# Patient Record
Sex: FEMALE | Race: WHITE | NOT HISPANIC OR LATINO | Employment: PART TIME
[De-identification: names, ages, dates, MRNs, and addresses within clinical notes are randomized per-mention and may not be internally consistent; named-entity substitution may affect disease eponyms.]

---

## 2017-09-10 ENCOUNTER — HEALTH MAINTENANCE LETTER (OUTPATIENT)
Age: 27
End: 2017-09-10

## 2019-04-13 ENCOUNTER — HOSPITAL ENCOUNTER (EMERGENCY)
Facility: CLINIC | Age: 29
Discharge: HOME | End: 2019-04-13
Attending: NURSE PRACTITIONER | Admitting: NURSE PRACTITIONER
Payer: COMMERCIAL

## 2019-04-13 VITALS
RESPIRATION RATE: 20 BRPM | DIASTOLIC BLOOD PRESSURE: 77 MMHG | OXYGEN SATURATION: 100 % | WEIGHT: 154 LBS | SYSTOLIC BLOOD PRESSURE: 125 MMHG | TEMPERATURE: 98.7 F | HEIGHT: 66 IN | BODY MASS INDEX: 24.75 KG/M2

## 2019-04-13 DIAGNOSIS — N30.01 ACUTE CYSTITIS WITH HEMATURIA: ICD-10-CM

## 2019-04-13 LAB
ALBUMIN UR-MCNC: 20 MG/DL
APPEARANCE UR: CLEAR
BACTERIA #/AREA URNS HPF: ABNORMAL /HPF
BILIRUB UR QL STRIP: NEGATIVE
COLOR UR AUTO: ABNORMAL
GLUCOSE UR STRIP-MCNC: NEGATIVE MG/DL
HCG UR QL: NEGATIVE
HGB UR QL STRIP: ABNORMAL
KETONES UR STRIP-MCNC: NEGATIVE MG/DL
LEUKOCYTE ESTERASE UR QL STRIP: ABNORMAL
MUCOUS THREADS #/AREA URNS LPF: PRESENT /LPF
NITRATE UR QL: NEGATIVE
PH UR STRIP: 6.5 PH (ref 5–7)
RBC #/AREA URNS AUTO: 25 /HPF (ref 0–2)
SOURCE: ABNORMAL
SP GR UR STRIP: 1.02 (ref 1–1.03)
SQUAMOUS #/AREA URNS AUTO: 2 /HPF (ref 0–1)
UROBILINOGEN UR STRIP-MCNC: NORMAL MG/DL (ref 0–2)
WBC #/AREA URNS AUTO: 79 /HPF (ref 0–5)

## 2019-04-13 PROCEDURE — 81025 URINE PREGNANCY TEST: CPT | Performed by: EMERGENCY MEDICINE

## 2019-04-13 PROCEDURE — 81001 URINALYSIS AUTO W/SCOPE: CPT | Performed by: EMERGENCY MEDICINE

## 2019-04-13 PROCEDURE — 99283 EMERGENCY DEPT VISIT LOW MDM: CPT

## 2019-04-13 RX ORDER — PHENAZOPYRIDINE HYDROCHLORIDE 200 MG/1
200 TABLET, FILM COATED ORAL 3 TIMES DAILY PRN
Qty: 6 TABLET | Refills: 0 | Status: SHIPPED | OUTPATIENT
Start: 2019-04-13 | End: 2019-04-15

## 2019-04-13 RX ORDER — NITROFURANTOIN 25; 75 MG/1; MG/1
100 CAPSULE ORAL 2 TIMES DAILY
Qty: 10 CAPSULE | Refills: 0 | Status: SHIPPED | OUTPATIENT
Start: 2019-04-13 | End: 2019-04-18

## 2019-04-13 ASSESSMENT — ENCOUNTER SYMPTOMS
FEVER: 0
ABDOMINAL PAIN: 0
FLANK PAIN: 1
DYSURIA: 1
VOMITING: 0

## 2019-04-13 ASSESSMENT — MIFFLIN-ST. JEOR: SCORE: 1435.29

## 2019-04-13 NOTE — ED PROVIDER NOTES
"  History     Chief Complaint:  Dysuria      HPI   Leigh Ann Monge is a 28 year old female who presents to the emergency department today for evaluation of dysuria. The patient reports that starting 4 days ago she developed intermittent dysuria and frequency, which worsened  today. She has noted a small amount of blood in urine.  She has some right sided low back pain, but this is normal for her. Denies flank pain or history of kidney stones.  No fever, vomiting, abdominal pain, abnormal vaginal discharge, or abdominal pain.  Last menses the beginning of this mouth.     Allergies:  No Known Drug Allergies     Medications:    Medications reviewed. No pertinent medications.     Past Medical History:    Anemia    Past Surgical History:        Family History:    Paternal grandmother: Diabetes  Paternal grandfather: Diabetes    Social History:  The patient was not accompanied to the ED.  Smoking Status: Never Smoker  Smokeless Tobacco: Never Used  Alcohol Use: Negative   Drug Use: Negative  PCP: An Martinez   Marital Status:        Review of Systems   Constitutional: Negative for fever.   Gastrointestinal: Negative for abdominal pain and vomiting.   Genitourinary: Positive for dysuria and flank pain. Negative for vaginal discharge.   All other systems reviewed and are negative.      Physical Exam     Patient Vitals for the past 24 hrs:   BP Temp Temp src Heart Rate Resp SpO2 Height Weight   19 1705 125/77 98.7  F (37.1  C) Oral 86 20 100 % 1.676 m (5' 6\") 69.9 kg (154 lb)        Physical Exam  Constitutional: Alert, attentive, GCS 15.    HENT: Mucous membranes are moist.   Eyes: EOM are normal. Conjunctiva pink, no scleral icterus or conjunctival injection  CV: regular rate and rhythm; no murmurs, rubs or gallups.  Radial pulses 2+ bilaterally.  Cap refill <2 seconds.  Respiratory: Effort normal. Lungs clear to auscultation bilaterally. No crackles/rubs/wheezes.  Good air movement.  GI:  " There is no tenderness; rebound or guarding. No distension. Normal bowel sounds.  No CVA tenderness.  MSK: Normal range of motion. No peripheral edema or calf tenderness.  Neurological: Alert, attentive.  Skin: Skin is warm and dry.  No rashes or petechiae.  Psychiatric: Normal affect.      Emergency Department Course     Laboratory:  Laboratory findings were communicated with the patient who voiced understanding of the findings.    Routine UA with microscopic: Blood small, Leukocyte esterase Moderate, WBC/HPF 79 (H), RBC/HPF 25 (H), Bacteria few, Squamous epithelial/HPF 2 (H), mucous present o/w WNL.   HCG Qualitative urine: Negative     Emergency Department Course:    1715 Nursing notes and vitals reviewed.    1717 The patient provided a urine sample here in the emergency department. This was sent for laboratory testing, findings above.     1728 I performed an exam of the patient as documented above.     1741 Patient rechecked and updated.      1750 I personally reviewed the laboratory results with the patient and answered all related questions prior to discharge.    Impression & Plan      Medical Decision Making:  Leigh Ann Monge is a 28 year old female who presents for evaluation of dysuria and frequency as detailed above.  This clinically is consistent with a urinary tract infection.  Urinalysis confirms the infection.  There has been no fever, flank pain or significant abdominal pain.  There is no clinical evidence of pyelonephritis, appendicitis, colitis, diverticulitis or any intraabdominal catastrophe. The patient will be started on antibiotics for the infection. Pyridium for symptom management; discussed only using this medication for two days. Follow-up with PCP in 3 days if not improving.  Return to ED with increasing pain, vomiting, fever, or inability to tolerate the oral antibiotic.    Diagnosis:    ICD-10-CM    1. Acute cystitis with hematuria N30.01      Disposition:   The patient is discharged to  home.     Discharge Medications:     Review of your medicines      START taking      Dose / Directions   nitroFURantoin macrocrystal-monohydrate 100 MG capsule  Commonly known as:  MACROBID      Dose:  100 mg  Take 1 capsule (100 mg) by mouth 2 times daily for 5 days  Quantity:  10 capsule  Refills:  0     phenazopyridine 200 MG tablet  Commonly known as:  PYRIDIUM      Dose:  200 mg  Take 1 tablet (200 mg) by mouth 3 times daily as needed for irritation  Quantity:  6 tablet  Refills:  0           Where to get your medicines      Some of these will need a paper prescription and others can be bought over the counter. Ask your nurse if you have questions.    Bring a paper prescription for each of these medications    nitroFURantoin macrocrystal-monohydrate 100 MG capsule    phenazopyridine 200 MG tablet         Scribe Disclosure:  I, Gilberto Lemon, am serving as a scribe at 5:29 PM on 4/13/2019 to document services personally performed by Onelia Vasquez APRN * based on my observations and the provider's statements to me.     [facility] EMERGENCY DEPARTMENT       Onelia Vasquez APRN CNP  04/13/19 1800

## 2019-04-13 NOTE — DISCHARGE INSTRUCTIONS
Begin antibiotics tonight.  Use Pyridium for symptoms as needed over the next 2 days.  This will turn your urine orange.  Follow-up if not improved in 3 days with primary clinic.  See below for reasons to return to emergency department.      Discharge Instructions  Urinary Tract Infection  You or your child have been diagnosed with a urinary tract infection, or UTI. The urinary tract includes the kidneys (which make urine/pee), ureters (the tubes that carry urine/pee from the kidneys to the bladder), the bladder (which stores urine/pee), and urethra (the tube that carries urine/pee out of the bladder). Urinary tract infections occur when bacteria travel up the urethra into the bladder (bladder infection) and, in some cases, from there into the kidneys (kidney infection).  Generally, every Emergency Department visit should have a follow-up clinic visit with either a primary or a specialty clinic/provider. Please follow-up as instructed by your emergency provider today.  Return to the Emergency Department if:  You or your child have severe back pain.  You or your child are vomiting (throwing up) so that you cannot take your medicine.  You or your child have a new fever (had not previously had a fever) over 101 F.  You or your child have confusion or are very weak, or feel very ill.  Your child seems much more ill, will not wake up, will not respond right, or is crying for a long time and will not calm down.  You or your child are showing signs of dehydration. These signs may include decreased urination (pee), dry mouth/gums/tongue, or decreased activity.    Follow-up with your provider:   Children under 24 months need to be seen by their regular provider within one week after a diagnosis of a UTI. It may be necessary to do some more tests to look at the child?s kidney or bladder.  You should begin to feel better within 24 - 48 hours of starting your antibiotic; follow-up with your regular clinic/doctor/provider if  this is not the case.    Treatment:   You will be treated with an antibiotic to kill the bacteria. We have to make an educated guess, based on what we know about common bacteria and antibiotics, as to which antibiotic will work for your infection. We will be correct most times but there will be some cases where the antibiotic chosen is not correct (see urine cultures below).  Take a pain medication such as acetaminophen (Tylenol ) or ibuprofen (Advil , Motrin , Nuprin ).  Phenazopyridine (Pyridium , Uristat ) is a prescription medication that numbs the bladder to reduce the burning pain of some UTIs.  The same medication is available in a non-prescription version (Azo-Standard , Urodol ). This medication will change the color of the urine and tears (usually blue or orange). If you wear contacts, do not wear them while taking this medication as they may be stained by the medication.    Urine Cultures:  If indicated, a urine culture may have been performed today. This test generally takes 24-48 hours to complete so the results are not known at this time. The results can confirm that an infection is present but also determine which antibiotic is effective for the specific bacteria that is causing the infection. If your urine culture shows that the antibiotic you were given today will not work to treat your infection, we will attempt to contact you to make arrangements to change the antibiotic. If the culture confirms that the antibiotic is effective for your infection, you will not be contacted. We often recommend follow-up with your regular physician/provider on the culture results regardless of this process.    Antibiotic Warning:   If you have been placed on antibiotics - watch for signs of allergic reaction.  These include rash, lip swelling, difficulty breathing, wheezing, and dizziness.  If you develop any of these symptoms, stop the antibiotic immediately and go to an emergency room or urgent care for  "evaluation.    Probiotics: If you have been given an antibiotic, you may want to also take a probiotic pill or eat yogurt with live cultures. Probiotics have \"good bacteria\" to help your intestines stay healthy. Studies have shown that probiotics help prevent diarrhea and other intestine problems (including C. diff infection) when you take antibiotics. You can buy these without a prescription in the pharmacy section of the store.   If you were given a prescription for medicine here today, be sure to read all of the information (including the package insert) that comes with your prescription.  This will include important information about the medicine, its side effects, and any warnings that you need to know about.  The pharmacist who fills the prescription can provide more information and answer questions you may have about the medicine.  If you have questions or concerns that the pharmacist cannot address, please call or return to the Emergency Department.   Remember that you can always come back to the Emergency Department if you are not able to see your regular provider in the amount of time listed above, if you get any new symptoms, or if there is anything that worries you.    "

## 2019-04-13 NOTE — ED TRIAGE NOTES
4 days of burning pain and frequency with urination.  No flank pain but scant blood in urine.  Patient alert and oriented x3.  Airway, breathing and circulation intact.

## 2019-04-13 NOTE — ED AVS SNAPSHOT
[facility] Emergency Department  [address]  [facility] [address]  Phone:  [phone]  Fax:  [phone]                                    Leigh Ann Monge   MRN: [id number]    Department:  [facility] Emergency Department   Date of Visit:  4/13/2019           After Visit Summary Signature Page    I have received my discharge instructions, and my questions have been answered. I have discussed any challenges I see with this plan with the nurse or doctor.    ..........................................................................................................................................  Patient/Patient Representative Signature      ..........................................................................................................................................  Patient Representative Print Name and Relationship to Patient    ..................................................               ................................................  Date                                   Time    ..........................................................................................................................................  Reviewed by Signature/Title    ...................................................              ..............................................  Date                                               Time          22EPIC Rev 08/18

## 2019-04-23 ENCOUNTER — HOSPITAL ENCOUNTER (EMERGENCY)
Facility: CLINIC | Age: 29
Discharge: HOME | End: 2019-04-23
Attending: PHYSICIAN ASSISTANT | Admitting: PHYSICIAN ASSISTANT
Payer: COMMERCIAL

## 2019-04-23 VITALS
DIASTOLIC BLOOD PRESSURE: 61 MMHG | SYSTOLIC BLOOD PRESSURE: 114 MMHG | TEMPERATURE: 98.4 F | OXYGEN SATURATION: 99 % | RESPIRATION RATE: 12 BRPM

## 2019-04-23 DIAGNOSIS — N39.0 UTI (URINARY TRACT INFECTION), UNCOMPLICATED: ICD-10-CM

## 2019-04-23 LAB
ALBUMIN UR-MCNC: NEGATIVE MG/DL
APPEARANCE UR: CLEAR
BACTERIA #/AREA URNS HPF: ABNORMAL /HPF
BILIRUB UR QL STRIP: NEGATIVE
COLOR UR AUTO: ABNORMAL
GLUCOSE UR STRIP-MCNC: NEGATIVE MG/DL
HCG UR QL: NEGATIVE
HGB UR QL STRIP: ABNORMAL
KETONES UR STRIP-MCNC: NEGATIVE MG/DL
LEUKOCYTE ESTERASE UR QL STRIP: ABNORMAL
MUCOUS THREADS #/AREA URNS LPF: PRESENT /LPF
NITRATE UR QL: NEGATIVE
PH UR STRIP: 6 PH (ref 5–7)
RBC #/AREA URNS AUTO: 2 /HPF (ref 0–2)
SOURCE: ABNORMAL
SP GR UR STRIP: 1 (ref 1–1.03)
SQUAMOUS #/AREA URNS AUTO: 1 /HPF (ref 0–1)
TRANS CELLS #/AREA URNS HPF: <1 /HPF (ref 0–1)
UROBILINOGEN UR STRIP-MCNC: NORMAL MG/DL (ref 0–2)
WBC #/AREA URNS AUTO: 54 /HPF (ref 0–5)
WBC CLUMPS #/AREA URNS HPF: PRESENT /HPF

## 2019-04-23 PROCEDURE — 81001 URINALYSIS AUTO W/SCOPE: CPT | Performed by: EMERGENCY MEDICINE

## 2019-04-23 PROCEDURE — 99283 EMERGENCY DEPT VISIT LOW MDM: CPT

## 2019-04-23 PROCEDURE — 81025 URINE PREGNANCY TEST: CPT | Performed by: EMERGENCY MEDICINE

## 2019-04-23 RX ORDER — CEPHALEXIN 500 MG/1
500 CAPSULE ORAL 2 TIMES DAILY
Qty: 14 CAPSULE | Refills: 0 | Status: SHIPPED | OUTPATIENT
Start: 2019-04-23 | End: 2019-04-30

## 2019-04-23 ASSESSMENT — ENCOUNTER SYMPTOMS
FREQUENCY: 1
ABDOMINAL PAIN: 0
BACK PAIN: 0
VOMITING: 0
DYSURIA: 1
FEVER: 0
FLANK PAIN: 0
HEMATURIA: 1

## 2019-04-23 NOTE — ED PROVIDER NOTES
History     Chief Complaint:  Dysuria, Hematuria & Frequency    HPI   Leigh Ann Monge is a 28 year old female who presents with dysuria, hematuria and urinary frequency. The patient reports that she just finished a course of Macrobid last week for a UTI. She states she felt better for a few days, but then 2 days ago developed dysuria again. Yesterday the patient states she was asymptomatic again, but then this morning had a return of dysuria, hematuria and urinary frequency. The patient otherwise denies any fevers, vomiting, abdominal, back or flank pain.     Allergies:  No known drug allergies.     Medications:    The patient is not currently taking any prescribed medications.    Past Medical History:    Anemia    Past Surgical History:     x 4    Family History:    History reviewed. No pertinent family history.     Social History:  Smoking Status: Never Smoker  Alcohol Use: No  Patient presents alone.  Marital Status:       Review of Systems   Constitutional: Negative for fever.   Gastrointestinal: Negative for abdominal pain and vomiting.   Genitourinary: Positive for dysuria, frequency and hematuria. Negative for flank pain.   Musculoskeletal: Negative for back pain.   All other systems reviewed and are negative.     Physical Exam     Patient Vitals for the past 24 hrs:   BP Temp Temp src Heart Rate Resp SpO2   19 1238 114/61 98.4  F (36.9  C) Temporal 87 12 99 %     Physical Exam   Constitutional: She is oriented to person, place, and time. She appears well-developed and well-nourished. No distress.   HENT:   Head: Normocephalic and atraumatic.   Eyes: Conjunctivae are normal.   Neck: Normal range of motion.   Cardiovascular: Normal rate and regular rhythm.   Pulmonary/Chest: Effort normal and breath sounds normal. No respiratory distress.   Abdominal: Soft. There is no tenderness. There is no rebound, no guarding and no CVA tenderness.   Musculoskeletal: Normal range of motion. She  exhibits no deformity.   Neurological: She is alert and oriented to person, place, and time.   Skin: Skin is warm and dry. She is not diaphoretic.   Psychiatric: She has a normal mood and affect. Her behavior is normal.         Emergency Department Course     Laboratory:    HCG qualitative urine: Negative    UA: Blood Urine Moderate (A), Leukocyte Esterase Urine Large (A), WBC Urine 54 (H), WBC Clumps Present (A), Bacteria Urine Few (A), Mucous Urine Present (A), o/w Negative    Emergency Department Course:  Past medical records, nursing notes, and vitals reviewed.  1349: I performed an exam of the patient and obtained history, as documented above.    Urinalysis was completed; see results above.    Findings and plan explained to the Patient. Patient discharged home with instructions regarding supportive care, medications, and reasons to return. The importance of close follow-up was reviewed.      Impression & Plan      Medical Decision Making:  Leigh Ann Monge is a 28 year old female presenting with dysuria. Urinalysis confirms infection. There has been no fever, back/flank pain or significant abdominal pain. There is no clinical evidence of pyelonephritis, appendicitis, or diverticulitis. The patient will be started on antibiotics for the infection. Follow up with primary physician is indicated if not improving in 2-3 days. Return immediately to ER if increasing pain, vomiting, fever, inability to tolerate the oral antibiotic, or for other concerns.      Diagnosis:    ICD-10-CM    1. UTI (urinary tract infection), uncomplicated N39.0        Disposition:  Discharged to home.    Discharge Medications:     Medication List      Started    cephALEXin 500 MG capsule  Commonly known as:  KEFLEX  500 mg, Oral, 2 TIMES DAILY              Kathryn Miller  4/23/2019   [facility] EMERGENCY DEPARTMENT  I, Kathryn Miller, am serving as a scribe at 1:49 PM on 4/23/2019 to document services personally performed by  Venus Soria PA-C based on my observations and the provider's statements to me.        Venus Soria PA-C  04/23/19 3683

## 2019-04-23 NOTE — ED AVS SNAPSHOT
[facility] Emergency Department  [address]  [facility] [address]  Phone:  [phone]  Fax:  [phone]                                    Leigh Ann Monge   MRN: [id number]    Department:  [facility] Emergency Department   Date of Visit:  4/23/2019           After Visit Summary Signature Page    I have received my discharge instructions, and my questions have been answered. I have discussed any challenges I see with this plan with the nurse or doctor.    ..........................................................................................................................................  Patient/Patient Representative Signature      ..........................................................................................................................................  Patient Representative Print Name and Relationship to Patient    ..................................................               ................................................  Date                                   Time    ..........................................................................................................................................  Reviewed by Signature/Title    ...................................................              ..............................................  Date                                               Time          22EPIC Rev 08/18

## 2024-04-19 ENCOUNTER — APPOINTMENT (OUTPATIENT)
Dept: MRI IMAGING | Facility: CLINIC | Age: 34
End: 2024-04-19
Attending: EMERGENCY MEDICINE
Payer: COMMERCIAL

## 2024-04-19 ENCOUNTER — MEDICAL CORRESPONDENCE (OUTPATIENT)
Dept: HEALTH INFORMATION MANAGEMENT | Facility: CLINIC | Age: 34
End: 2024-04-19

## 2024-04-19 ENCOUNTER — HOSPITAL ENCOUNTER (EMERGENCY)
Facility: CLINIC | Age: 34
Discharge: HOME | End: 2024-04-19
Attending: EMERGENCY MEDICINE | Admitting: EMERGENCY MEDICINE
Payer: COMMERCIAL

## 2024-04-19 ENCOUNTER — APPOINTMENT (OUTPATIENT)
Dept: CT IMAGING | Facility: CLINIC | Age: 34
End: 2024-04-19
Attending: EMERGENCY MEDICINE
Payer: COMMERCIAL

## 2024-04-19 VITALS
TEMPERATURE: 97.5 F | BODY MASS INDEX: 28.1 KG/M2 | DIASTOLIC BLOOD PRESSURE: 70 MMHG | WEIGHT: 174.82 LBS | SYSTOLIC BLOOD PRESSURE: 106 MMHG | RESPIRATION RATE: 18 BRPM | OXYGEN SATURATION: 99 % | HEART RATE: 69 BPM | HEIGHT: 66 IN

## 2024-04-19 DIAGNOSIS — R51.9 ACUTE NONINTRACTABLE HEADACHE, UNSPECIFIED HEADACHE TYPE: ICD-10-CM

## 2024-04-19 DIAGNOSIS — H53.9 VISION CHANGES: ICD-10-CM

## 2024-04-19 DIAGNOSIS — R41.0 CONFUSION: ICD-10-CM

## 2024-04-19 LAB
ANION GAP SERPL CALCULATED.3IONS-SCNC: 9 MMOL/L (ref 7–15)
BASOPHILS # BLD AUTO: 0 10E3/UL (ref 0–0.2)
BASOPHILS NFR BLD AUTO: 1 %
BUN SERPL-MCNC: 9.2 MG/DL (ref 6–20)
CALCIUM SERPL-MCNC: 9.1 MG/DL (ref 8.6–10)
CHLORIDE SERPL-SCNC: 104 MMOL/L (ref 98–107)
CREAT SERPL-MCNC: 0.68 MG/DL (ref 0.51–0.95)
DEPRECATED HCO3 PLAS-SCNC: 26 MMOL/L (ref 22–29)
EGFRCR SERPLBLD CKD-EPI 2021: >90 ML/MIN/1.73M2
EOSINOPHIL # BLD AUTO: 0.2 10E3/UL (ref 0–0.7)
EOSINOPHIL NFR BLD AUTO: 3 %
ERYTHROCYTE [DISTWIDTH] IN BLOOD BY AUTOMATED COUNT: 12.4 % (ref 10–15)
GLUCOSE SERPL-MCNC: 100 MG/DL (ref 70–99)
HCT VFR BLD AUTO: 38.4 % (ref 35–47)
HGB BLD-MCNC: 12.8 G/DL (ref 11.7–15.7)
HOLD SPECIMEN: NORMAL
HOLD SPECIMEN: NORMAL
IMM GRANULOCYTES # BLD: 0 10E3/UL
IMM GRANULOCYTES NFR BLD: 0 %
LYMPHOCYTES # BLD AUTO: 1.9 10E3/UL (ref 0.8–5.3)
LYMPHOCYTES NFR BLD AUTO: 31 %
MAGNESIUM SERPL-MCNC: 2 MG/DL (ref 1.7–2.3)
MCH RBC QN AUTO: 30 PG (ref 26.5–33)
MCHC RBC AUTO-ENTMCNC: 33.3 G/DL (ref 31.5–36.5)
MCV RBC AUTO: 90 FL (ref 78–100)
MONOCYTES # BLD AUTO: 0.3 10E3/UL (ref 0–1.3)
MONOCYTES NFR BLD AUTO: 5 %
NEUTROPHILS # BLD AUTO: 3.7 10E3/UL (ref 1.6–8.3)
NEUTROPHILS NFR BLD AUTO: 60 %
NRBC # BLD AUTO: 0 10E3/UL
NRBC BLD AUTO-RTO: 0 /100
PLATELET # BLD AUTO: 201 10E3/UL (ref 150–450)
POTASSIUM SERPL-SCNC: 3.8 MMOL/L (ref 3.4–5.3)
RBC # BLD AUTO: 4.27 10E6/UL (ref 3.8–5.2)
SODIUM SERPL-SCNC: 139 MMOL/L (ref 135–145)
TSH SERPL DL<=0.005 MIU/L-ACNC: 2.45 UIU/ML (ref 0.3–4.2)
WBC # BLD AUTO: 6.1 10E3/UL (ref 4–11)

## 2024-04-19 PROCEDURE — 84443 ASSAY THYROID STIM HORMONE: CPT | Performed by: EMERGENCY MEDICINE

## 2024-04-19 PROCEDURE — 250N000011 HC RX IP 250 OP 636: Mod: JZ | Performed by: EMERGENCY MEDICINE

## 2024-04-19 PROCEDURE — 93005 ELECTROCARDIOGRAM TRACING: CPT

## 2024-04-19 PROCEDURE — 96374 THER/PROPH/DIAG INJ IV PUSH: CPT | Mod: 59

## 2024-04-19 PROCEDURE — 250N000009 HC RX 250: Performed by: EMERGENCY MEDICINE

## 2024-04-19 PROCEDURE — 96361 HYDRATE IV INFUSION ADD-ON: CPT

## 2024-04-19 PROCEDURE — 250N000011 HC RX IP 250 OP 636: Performed by: EMERGENCY MEDICINE

## 2024-04-19 PROCEDURE — 36415 COLL VENOUS BLD VENIPUNCTURE: CPT | Performed by: EMERGENCY MEDICINE

## 2024-04-19 PROCEDURE — 99285 EMERGENCY DEPT VISIT HI MDM: CPT | Mod: 25

## 2024-04-19 PROCEDURE — 258N000003 HC RX IP 258 OP 636: Performed by: EMERGENCY MEDICINE

## 2024-04-19 PROCEDURE — 84703 CHORIONIC GONADOTROPIN ASSAY: CPT

## 2024-04-19 PROCEDURE — 255N000002 HC RX 255 OP 636: Performed by: EMERGENCY MEDICINE

## 2024-04-19 PROCEDURE — 83735 ASSAY OF MAGNESIUM: CPT | Performed by: EMERGENCY MEDICINE

## 2024-04-19 PROCEDURE — 70496 CT ANGIOGRAPHY HEAD: CPT

## 2024-04-19 PROCEDURE — 96375 TX/PRO/DX INJ NEW DRUG ADDON: CPT | Mod: 59

## 2024-04-19 PROCEDURE — 85025 COMPLETE CBC W/AUTO DIFF WBC: CPT | Performed by: EMERGENCY MEDICINE

## 2024-04-19 PROCEDURE — 70553 MRI BRAIN STEM W/O & W/DYE: CPT

## 2024-04-19 PROCEDURE — A9585 GADOBUTROL INJECTION: HCPCS | Performed by: EMERGENCY MEDICINE

## 2024-04-19 PROCEDURE — 70450 CT HEAD/BRAIN W/O DYE: CPT | Mod: XU

## 2024-04-19 PROCEDURE — 80048 BASIC METABOLIC PNL TOTAL CA: CPT | Performed by: EMERGENCY MEDICINE

## 2024-04-19 RX ORDER — GADOBUTROL 604.72 MG/ML
8 INJECTION INTRAVENOUS ONCE
Status: COMPLETED | OUTPATIENT
Start: 2024-04-19 | End: 2024-04-19

## 2024-04-19 RX ORDER — IOPAMIDOL 755 MG/ML
500 INJECTION, SOLUTION INTRAVASCULAR ONCE
Status: COMPLETED | OUTPATIENT
Start: 2024-04-19 | End: 2024-04-19

## 2024-04-19 RX ORDER — METOCLOPRAMIDE HYDROCHLORIDE 5 MG/ML
10 INJECTION INTRAMUSCULAR; INTRAVENOUS ONCE
Status: COMPLETED | OUTPATIENT
Start: 2024-04-19 | End: 2024-04-19

## 2024-04-19 RX ORDER — DIPHENHYDRAMINE HYDROCHLORIDE 50 MG/ML
25 INJECTION INTRAMUSCULAR; INTRAVENOUS ONCE
Status: COMPLETED | OUTPATIENT
Start: 2024-04-19 | End: 2024-04-19

## 2024-04-19 RX ADMIN — IOPAMIDOL 67 ML: 755 INJECTION, SOLUTION INTRAVENOUS at 16:25

## 2024-04-19 RX ADMIN — DIPHENHYDRAMINE HYDROCHLORIDE 25 MG: 50 INJECTION, SOLUTION INTRAMUSCULAR; INTRAVENOUS at 15:55

## 2024-04-19 RX ADMIN — SODIUM CHLORIDE 100 ML: 9 INJECTION, SOLUTION INTRAVENOUS at 16:25

## 2024-04-19 RX ADMIN — SODIUM CHLORIDE 1000 ML: 9 INJECTION, SOLUTION INTRAVENOUS at 15:54

## 2024-04-19 RX ADMIN — METOCLOPRAMIDE HYDROCHLORIDE 10 MG: 5 INJECTION INTRAMUSCULAR; INTRAVENOUS at 15:56

## 2024-04-19 RX ADMIN — GADOBUTROL 8 ML: 604.72 INJECTION INTRAVENOUS at 18:40

## 2024-04-19 ASSESSMENT — ACTIVITIES OF DAILY LIVING (ADL)
ADLS_ACUITY_SCORE: 37

## 2024-04-19 ASSESSMENT — COLUMBIA-SUICIDE SEVERITY RATING SCALE - C-SSRS
6. HAVE YOU EVER DONE ANYTHING, STARTED TO DO ANYTHING, OR PREPARED TO DO ANYTHING TO END YOUR LIFE?: NO
2. HAVE YOU ACTUALLY HAD ANY THOUGHTS OF KILLING YOURSELF IN THE PAST MONTH?: NO
1. IN THE PAST MONTH, HAVE YOU WISHED YOU WERE DEAD OR WISHED YOU COULD GO TO SLEEP AND NOT WAKE UP?: NO

## 2024-04-19 NOTE — ED PROVIDER NOTES
"  History     Chief Complaint:  Altered Mental Status    The history is provided by the patient, the spouse and a relative.      Leigh Ann Monge is a 33 year old female who presents to the ED with concerns of altered mental status. About 2 hours ago, around 1330, patient started experiencing blurry vision while she was walking. Denies complete loss of vision or blackouts. She states that she could see her hands in front of her, but was unable to text. At that time, patient was also experiencing troubles with balance and a spinning sensation where she was afraid to walk, as she felt that she could fall over. Following the onset of blurry vision, patient endorses having a headache localized to the front of her head that comes and goes, and fluctuates from mild to extreme. Patient endorses bilateral cheek numbness that is resolved. Currently, the patient endorses a headache, nausea, and chills.  She notes that the numbness has resolved and she no longer has blurry vision.  She stated that she took Tylenol around 1500 with no relief. Leading up to the event, patient endorses having a normal day without any extreme exertion, anxiety, or other stressors.  Patient denies any vomiting, pharyngitis, cough, rhinitis, or sleep changes. No chronic medical problems. Patient also denies any diagnoses with headache syndromes or any family history of vascular or cardiac issues or strokes.    Independent Historian:   Spouse/Partner - They report that the patient has a history of severe headaches without a formal diagnosis. He recalls in 2008 a similar situation where the patient had a pressure headache described as \"expanding.\" He states that she had brain imaging at that time and it was normal.    Daughter - They report that the patient was able to recognize people and names, but could not connect the two. For example, the patient's daughter said that the patient could recognize her but had trouble placing her name.      Review of " "External Notes:   I reviewed the patient's MRI from , which was normal.    Medications:    The patient is not currently taking any prescribed medications.    Past Medical History:    Anemia    Past Surgical History:     section     Physical Exam   Patient Vitals for the past 24 hrs:   BP Temp Temp src Pulse Resp SpO2 Height Weight   24 1650 108/67 -- -- 76 -- 99 % -- --   24 1630 118/67 -- -- 81 -- 99 % -- --   24 1600 134/75 -- -- 99 -- 100 % -- --   24 1503 (!) 142/89 97.5  F (36.4  C) Temporal 93 18 100 % 1.676 m (5' 6\") 79.3 kg (174 lb 13.2 oz)        Physical Exam    General: Adult sitting upright, teary  Eyes: PERRL, Conjunctive within normal limits.  EOMI.  HENT: No appreciable scalp abnormality.  Moist mucous membranes, oropharynx clear.   Neck: No rigidity.  CV: Normal S1S2, no murmur, rub or gallop. Regular rate and rhythm  Resp: Clear to auscultation bilaterally, no wheezes, rales or rhonchi. Normal respiratory effort.  GI: Abdomen is soft, nontender and nondistended. No palpable masses. No rebound or guarding.  MSK: No edema. Nontender. Normal active range of motion.  Skin: Warm and dry. No rashes or lesions or ecchymoses on visible skin.  Neuro: Alert and oriented. Responds appropriately to all questions and commands. No focal findings appreciated. Normal muscle tone.  Psych: Normal mood and affect. Pleasant.     Emergency Department Course   ECG  ECG taken at 1518, ECG read at 1522.  Undetermined rhythm  Otherwise normal ECG    Rate 80 bpm. GA interval 150 ms. QRS duration 86 ms. QT/QTc 390/449 ms. P-R-T axes 25 77 55.     Imaging:  MR Brain w/o & w Contrast   Final Result   IMPRESSION:   1.  Normal head MRI.      CTA Head Neck with Contrast   Final Result   IMPRESSION:       HEAD CTA:    1.  Normal CTA [place] [facility] [place].      NECK CTA:   1.  Normal neck CTA.      CT Head w/o Contrast   Final Result   IMPRESSION:  Normal head CT.      Radiation dose for this scan was " reduced using automated exposure   control, adjustment of the mA and/or kV according to patient size, or   iterative reconstruction technique.              Laboratory:  Labs Ordered and Resulted from Time of ED Arrival to Time of ED Departure   BASIC METABOLIC PANEL - Abnormal       Result Value    Sodium 139      Potassium 3.8      Chloride 104      Carbon Dioxide (CO2) 26      Anion Gap 9      Urea Nitrogen 9.2      Creatinine 0.68      GFR Estimate >90      Calcium 9.1      Glucose 100 (*)    MAGNESIUM - Normal    Magnesium 2.0     TSH WITH FREE T4 REFLEX - Normal    TSH 2.45     CBC WITH PLATELETS AND DIFFERENTIAL    WBC Count 6.1      RBC Count 4.27      Hemoglobin 12.8      Hematocrit 38.4      MCV 90      MCH 30.0      MCHC 33.3      RDW 12.4      Platelet Count 201      % Neutrophils 60      % Lymphocytes 31      % Monocytes 5      % Eosinophils 3      % Basophils 1      % Immature Granulocytes 0      NRBCs per 100 WBC 0      Absolute Neutrophils 3.7      Absolute Lymphocytes 1.9      Absolute Monocytes 0.3      Absolute Eosinophils 0.2      Absolute Basophils 0.0      Absolute Immature Granulocytes 0.0      Absolute NRBCs 0.0          Emergency Department Course & Assessments:    Interventions:  Medications   sodium chloride 0.9 % bag 100 mL for CT scan flush use (100 mLs As instructed $Given 4/19/24 1625)   sodium chloride 0.9% BOLUS 1,000 mL (0 mLs Intravenous Stopped 4/19/24 1922)   diphenhydrAMINE (BENADRYL) injection 25 mg (25 mg Intravenous $Given 4/19/24 1555)   metoclopramide (REGLAN) injection 10 mg (10 mg Intravenous $Given 4/19/24 1556)   iopamidol (ISOVUE-370) solution 500 mL (67 mLs Intravenous $Given 4/19/24 1625)   gadobutrol (GADAVIST) injection 8 mL (8 mLs Intravenous $Given 4/19/24 1840)        Independent Interpretation (X-rays, CTs, rhythm strip):  I reviewed the patient's head CT.  I see no signs of intracranial hemorrhage.    Consultations/Discussion of Management or Tests:  ED Course  as of 04/19/24 2035 Fri Apr 19, 2024   1530 I obtained history and examined the patient as noted above.     1745 I rechecked and updated the patient. Patient stated that she was feeling better.     1944 I rechecked and updated the patient.  She denies any recurrence of symptoms, noting ongoing mild headache.  She feels comfortable to plan for discharge.         Social Determinants of Health affecting care:   None    Disposition:  The patient was discharged.     Impression & Plan    CMS Diagnoses: None     Medical Decision Making:  Leigh Ann Monge is a 33-year-old female healthy at baseline who presents emergency department with concerns for headache with associated blurry vision at the onset, bilateral cheek and arm numbness, a feeling of confusion and disorientation.  She apparently has had similar headaches in the past and neurologic evaluation but did not follow-up with neurology and has not had these episodes frequently.  I considered migraine headache or tension headache.  I had lower suspicion for acute CVA or demyelinating condition.  She has no focal neurologic deficits.  She has symptoms that be atypical for CVA but given the symptoms in combination, thorough evaluations was undertaken with brain imaging.  In resolution of blurry vision and lack of any vision changes at this time, pseudotumor cerebri seems less likely.  Fortunately there are no acute appearing concerns on evaluation today and her symptoms improved over time here.  I used medications that were typically used for migraine headache therapy.  I do not think there is indication for admission or TIA workup.  I think alternatively, outpatient neurology evaluation would be indicated and a referral was made.  She is with family and will return with worsening of symptoms.  She should follow-up with her primary care provider as well within the upcoming days.  Supportive care discussed with the patient.  All questions were answered prior to  discharge.      Diagnosis:    ICD-10-CM    1. Acute nonintractable headache, unspecified headache type  R51.9       2. Confusion  R41.0     mild, resolved      3. Vision changes  H53.9     blurry vision, resolved           Scribe Disclosure:  IChristiano, am serving as a scribe at 4:17 PM on 4/19/2024 to document services personally performed by Leti Wells MD based on my observations and the provider's statements to me.     Scribe Disclosure:  Tim FERNÁNDEZ Hailie, am serving as a scribe at 5:20 PM on 4/19/2024 to document services personally performed by Leti Wells MD based on my observations and the provider's statements to me.     4/19/2024   Leti Wells MD Jonkman, Tracy Dianne, MD  04/19/24 0209

## 2024-04-19 NOTE — ED TRIAGE NOTES
"Patient reports new headache, \"feeling off\", bilateral numbness about 1 hour prior to ED arrival.  No medial conditions or daily mediations. Negative BE FAST upon triage.  ABCs intact, A&OX4.     Triage Assessment (Adult)       Row Name 04/19/24 1504          Triage Assessment    Airway WDL WDL        Respiratory WDL    Respiratory WDL WDL        Skin Circulation/Temperature WDL    Skin Circulation/Temperature WDL WDL        Cardiac WDL    Cardiac WDL WDL        Peripheral/Neurovascular WDL    Peripheral Neurovascular WDL WDL        Cognitive/Neuro/Behavioral WDL    Cognitive/Neuro/Behavioral WDL X                     "

## 2024-04-20 LAB — HCG SER QL IA.RAPID: NEGATIVE

## 2024-04-20 NOTE — DISCHARGE INSTRUCTIONS
The cause of your symptoms is not clear.  There are no findings today that just an acute life-threatening cause.  You should follow-up with your primary care provider and neurology within this upcoming week.  You should return to the emergency department worsening.  Avoid driving, operating machinery, or working at heights.      Discharge Instructions  Headache    You were seen today for a headache. Headaches may be caused by many different things such as muscle tension, sinus inflammation, anxiety and stress, having too little sleep, too much alcohol, some medical conditions or injury. You may have a migraine, which is caused by changes in the blood vessels in your head.  At this time your provider does not find that your headache is a sign of anything dangerous or life-threatening.  However, sometimes the signs of serious illness do not show up right away.      Generally, every Emergency Department visit should have a follow-up clinic visit with either a primary or a specialty clinic/provider. Please follow-up as instructed by your emergency provider today.    Return to the Emergency Department if:  You get a new fever of 100.4 F or higher.  Your headache gets much worse.  You get a stiff neck with your headache.  You get a new headache that is significantly different or worse than headaches you have had before.  You are vomiting (throwing up) and cannot keep food or water down.  You have blurry or double vision or other problems with your eyes.  You have a new weakness on one side of your body.  You have difficulty with balance which is new.  You or your family thinks you are confused.  You have a seizure.    What can I do to help myself?  Pain medications - You may take a pain medication such as Tylenol  (acetaminophen), Advil , Motrin  (ibuprofen) or Aleve  (naproxen).  Take a pain reliever as soon as you notice symptoms.  Starting medications as soon as you start to have symptoms may lessen the amount of pain  you have.  Relaxing in a quiet, dark room may help.  Get enough sleep and eat meals regularly.  You may need to watch for certain foods or other things which may trigger your headaches.  Keeping a journal of your headaches and possible triggers may help you and your primary provider to identify things which you should avoid which may be causing your headaches.  If you were given a prescription for medicine here today, be sure to read all of the information (including the package insert) that comes with your prescription.  This will include important information about the medicine, its side effects, and any warnings that you need to know about.  The pharmacist who fills the prescription can provide more information and answer questions you may have about the medicine.  If you have questions or concerns that the pharmacist cannot address, please call or return to the Emergency Department.   Remember that you can always come back to the Emergency Department if you are not able to see your regular provider in the amount of time listed above, if you get any new symptoms, or if there is anything that worries you.

## 2024-04-20 NOTE — ED NOTES
Patient returned from MRI, states that she is feeling better, headache is currently 3/10, patient requesting water.  Awaiting MRI results and MD re-eval.

## 2024-04-22 LAB
ATRIAL RATE - MUSE: 79 BPM
DIASTOLIC BLOOD PRESSURE - MUSE: NORMAL MMHG
INTERPRETATION ECG - MUSE: NORMAL
P AXIS - MUSE: 25 DEGREES
PR INTERVAL - MUSE: 150 MS
QRS DURATION - MUSE: 86 MS
QT - MUSE: 390 MS
QTC - MUSE: 449 MS
R AXIS - MUSE: 77 DEGREES
SYSTOLIC BLOOD PRESSURE - MUSE: NORMAL MMHG
T AXIS - MUSE: 55 DEGREES
VENTRICULAR RATE- MUSE: 80 BPM